# Patient Record
Sex: MALE | Race: WHITE | NOT HISPANIC OR LATINO
[De-identification: names, ages, dates, MRNs, and addresses within clinical notes are randomized per-mention and may not be internally consistent; named-entity substitution may affect disease eponyms.]

---

## 2019-11-08 ENCOUNTER — RESULT REVIEW (OUTPATIENT)
Age: 74
End: 2019-11-08

## 2019-11-08 PROBLEM — Z00.00 ENCOUNTER FOR PREVENTIVE HEALTH EXAMINATION: Status: ACTIVE | Noted: 2019-11-08

## 2019-11-11 ENCOUNTER — RECORD ABSTRACTING (OUTPATIENT)
Age: 74
End: 2019-11-11

## 2019-11-11 DIAGNOSIS — I10 ESSENTIAL (PRIMARY) HYPERTENSION: ICD-10-CM

## 2019-11-11 DIAGNOSIS — N35.919 UNSPECIFIED URETHRAL STRICTURE, MALE, UNSPECIFIED SITE: ICD-10-CM

## 2019-11-11 DIAGNOSIS — Z78.9 OTHER SPECIFIED HEALTH STATUS: ICD-10-CM

## 2019-11-11 DIAGNOSIS — Z87.891 PERSONAL HISTORY OF NICOTINE DEPENDENCE: ICD-10-CM

## 2019-11-11 DIAGNOSIS — Z87.09 PERSONAL HISTORY OF OTHER DISEASES OF THE RESPIRATORY SYSTEM: ICD-10-CM

## 2019-11-11 DIAGNOSIS — Z80.42 FAMILY HISTORY OF MALIGNANT NEOPLASM OF PROSTATE: ICD-10-CM

## 2019-11-11 DIAGNOSIS — Z86.39 PERSONAL HISTORY OF OTHER ENDOCRINE, NUTRITIONAL AND METABOLIC DISEASE: ICD-10-CM

## 2019-11-11 DIAGNOSIS — Z87.440 PERSONAL HISTORY OF URINARY (TRACT) INFECTIONS: ICD-10-CM

## 2019-11-11 DIAGNOSIS — Z87.710 PERSONAL HISTORY OF (CORRECTED) HYPOSPADIAS: ICD-10-CM

## 2019-11-11 LAB
PSA FREE FLD-MCNC: 0.6
PSA FREE FLD-MCNC: 0.6
PSA FREE FLD-MCNC: 0.63
PSA FREE FLD-MCNC: 16.5
PSA FREE FLD-MCNC: 18.1
PSA FREE FLD-MCNC: 18.2
PSA SERPL-MCNC: 3.32
PSA SERPL-MCNC: 3.47
PSA SERPL-MCNC: 3.63

## 2019-11-11 RX ORDER — SILODOSIN 8 MG/1
8 CAPSULE ORAL
Refills: 0 | Status: ACTIVE | COMMUNITY

## 2019-11-11 RX ORDER — TAMSULOSIN HCL 0.4 MG
CAPSULE ORAL
Refills: 0 | Status: ACTIVE | COMMUNITY

## 2019-11-11 RX ORDER — MONTELUKAST SODIUM 10 MG/1
TABLET, FILM COATED ORAL
Refills: 0 | Status: ACTIVE | COMMUNITY

## 2019-11-11 RX ORDER — FINASTERIDE 1 MG/1
TABLET ORAL
Refills: 0 | Status: ACTIVE | COMMUNITY

## 2019-11-13 ENCOUNTER — APPOINTMENT (OUTPATIENT)
Dept: UROLOGY | Facility: CLINIC | Age: 74
End: 2019-11-13

## 2019-12-30 ENCOUNTER — RX RENEWAL (OUTPATIENT)
Age: 74
End: 2019-12-30

## 2020-04-24 ENCOUNTER — APPOINTMENT (OUTPATIENT)
Dept: UROLOGY | Facility: CLINIC | Age: 75
End: 2020-04-24
Payer: COMMERCIAL

## 2020-04-24 DIAGNOSIS — R31.29 OTHER MICROSCOPIC HEMATURIA: ICD-10-CM

## 2020-04-24 DIAGNOSIS — N40.2 NODULAR PROSTATE W/OUT LOWER URINARY TRACT SYMPTOMS: ICD-10-CM

## 2020-04-24 DIAGNOSIS — N40.0 BENIGN PROSTATIC HYPERPLASIA WITHOUT LOWER URINARY TRACT SYMPMS: ICD-10-CM

## 2020-04-24 DIAGNOSIS — N52.9 MALE ERECTILE DYSFUNCTION, UNSPECIFIED: ICD-10-CM

## 2020-04-24 PROCEDURE — 99215 OFFICE O/P EST HI 40 MIN: CPT | Mod: 95

## 2020-04-24 RX ORDER — SILDENAFIL 100 MG/1
100 TABLET, FILM COATED ORAL
Qty: 90 | Refills: 1 | Status: ACTIVE | COMMUNITY
Start: 2020-04-24 | End: 1900-01-01

## 2020-04-24 RX ORDER — TAMSULOSIN HYDROCHLORIDE 0.4 MG/1
0.4 CAPSULE ORAL
Qty: 90 | Refills: 3 | Status: ACTIVE | COMMUNITY
Start: 2020-04-24 | End: 1900-01-01

## 2020-04-24 NOTE — ASSESSMENT
[FreeTextEntry1] : We discussed in detail his prior DREs and the need for continued FU. I recommended that he come to the office in 3 months for STEPHANIE and he will make that appointment. We also discussed remaining off the oxybutynin and remaining on the tamsulosin and finasteride, and I reordered these for him. We also discussed continuing the sildenafil 100 mg for his ED and this was also reordered. I recommended that he have a repeat PSA drawn in 6 weeks ( due to CO-V ID [id number] )  and to hannah me to review these results. We ended the video portion of the visit a 11:06 AM. Josi Parr MD\par

## 2020-04-24 NOTE — LETTER BODY
[Dear  ___] : Dear  [unfilled], [Please see my note below.] : Please see my note below. [FreeTextEntry1] : I had the pleasure of evaluation of your patient today via a telehealth virtual visit.\par  [Consult Closing:] : Thank you very much for allowing me to participate in the care of this patient.  If you have any questions, please do not hesitate to contact me. [FreeTextEntry3] : Best Regards, \par \par Josi Parr MD\par

## 2020-04-24 NOTE — HISTORY OF PRESENT ILLNESS
[Home] : at home, [unfilled] , at the time of the visit. [Other Location: e.g. Home (Enter Location, City,State)___] : at [unfilled] [Patient] : the patient [FreeTextEntry1] :  The patient-doctor relationship has been established in a face to face fashion via real time video/audio HIPAA compliant communication using telemedicine software. The patient's identity has been confirmed. The patient was previously emailed a copy of the telemedicine consent. They have had a chance to review and has now given verbal consent and has requested care to be assessed and treated through telemedicine and understands there maybe limitations in this process and they may need further follow up care in the office and or hospital settings. \par Verbal consent given on 4/24/2020, at 10:40, by Rodri Garibay.\par \par This 74 year old male is being evaluated today for the above problems. He had a prostate biopsy on 10/29/19 for a suspicious STEPHANIE and PSA of 3.63 ( 16.5%)and this returned BPH. He is also taking tamsulosin for the BPH and reports no LUTS or hematuria. \par \par Patient tells me that he had spinal surgery on 2/21/2020 and developed acute post-op retention requiring a Hartley catheter. He was seen by an Urologist at [facility] and his medication was changed. The Oxybutynin was discontinued, and finasteride 5 mg daily was added to the tamsulosin 0.4 mg daily. He has been voiding well on this regimen.

## 2020-05-19 ENCOUNTER — APPOINTMENT (OUTPATIENT)
Dept: UROLOGY | Facility: CLINIC | Age: 75
End: 2020-05-19

## 2020-09-22 ENCOUNTER — APPOINTMENT (OUTPATIENT)
Dept: UROLOGY | Facility: CLINIC | Age: 75
End: 2020-09-22

## 2020-10-27 ENCOUNTER — APPOINTMENT (OUTPATIENT)
Dept: UROLOGY | Facility: CLINIC | Age: 75
End: 2020-10-27
Payer: COMMERCIAL

## 2020-10-27 VITALS — SYSTOLIC BLOOD PRESSURE: 111 MMHG | HEART RATE: 96 BPM | TEMPERATURE: 98.3 F | DIASTOLIC BLOOD PRESSURE: 67 MMHG

## 2020-10-27 PROCEDURE — 99072 ADDL SUPL MATRL&STAF TM PHE: CPT

## 2020-10-27 PROCEDURE — 51798 US URINE CAPACITY MEASURE: CPT

## 2020-10-27 PROCEDURE — 99214 OFFICE O/P EST MOD 30 MIN: CPT | Mod: 25

## 2020-10-27 RX ORDER — TAMSULOSIN HYDROCHLORIDE 0.4 MG/1
0.4 CAPSULE ORAL
Qty: 90 | Refills: 3 | Status: ACTIVE | COMMUNITY
Start: 2019-12-30 | End: 1900-01-01

## 2020-10-27 RX ORDER — SILDENAFIL 20 MG/1
20 TABLET ORAL
Qty: 90 | Refills: 3 | Status: ACTIVE | COMMUNITY
Start: 2020-10-27 | End: 1900-01-01

## 2020-10-27 RX ORDER — FINASTERIDE 5 MG/1
5 TABLET, FILM COATED ORAL DAILY
Qty: 90 | Refills: 3 | Status: ACTIVE | COMMUNITY
Start: 2020-04-24 | End: 1900-01-01

## 2020-10-28 NOTE — HISTORY OF PRESENT ILLNESS
[Urinary Urgency] : urinary urgency [Urinary Frequency] : urinary frequency [Nocturia] : nocturia [Erectile Dysfunction] : Erectile Dysfunction [FreeTextEntry1] : 74M with history of: BPH, ED, urethral stricture, neurogenic bladder. microhematuria, prostate nodule, hypogonadism, hypospadias w. repair, asthma, HTN, hip replacement and rotator cuff repair. COVID + Sept 2020. \par \par Pt of Dr. Parr \par \par Spinal surgery Feb 2020, resulting in post-op urinary retention. Stopped oxybutynin and started on finasteride 5 mg by [facility] urologist. Currently on Tamsulosin \par \par PBNX Oct 2019 for suspicious STEPHANIE and PSA 3.63 (16.5%). Dx: BPH \par \par Denies current fever\par Nocturia 3-4 x's\par + urgency and frequency. Comfortable with current symptoms \par \par Coffee 5-6's/day \par \par Occupation: CPA\par No alcohol intake, cigarette use as a teenager. Stopped 50 yrs ago. \par \par Family history: Father prostate cancer. Denies history of breast ca, kidney cancer and bladder cancer.  [Urinary Incontinence] : no urinary incontinence [Urinary Retention] : no urinary retention [Dysuria] : no dysuria [Hematuria - Gross] : no gross hematuria [Bladder Spasm] : no bladder spasm [Abdominal Pain] : no abdominal pain [Flank Pain] : no flank pain [Edema] : ~T edema was not present [Weight Loss] : no recent ~M [unfilled] weight loss [Fever] : no fever [Fatigue] : no fatigue [Nausea] : no nausea

## 2020-10-28 NOTE — ASSESSMENT
[FreeTextEntry1] : BPH\par -PVR= 87 mL \par -UA/UCx/PSA today \par -Continue Tamsulosin 0.4 mg and finasteride 5 mg \par -would not resume oxybutynin given retention hisotry\par -Decrease caffeine intake \par \par ED\par -Continue Sildenafil 100 mg \par \par F/u 1 year or PRN

## 2020-10-28 NOTE — PHYSICAL EXAM
[General Appearance - Well Developed] : well developed [General Appearance - Well Nourished] : well nourished [Normal Appearance] : normal appearance [Well Groomed] : well groomed [General Appearance - In No Acute Distress] : no acute distress [Edema] : no peripheral edema [Respiration, Rhythm And Depth] : normal respiratory rhythm and effort [Exaggerated Use Of Accessory Muscles For Inspiration] : no accessory muscle use [Abdomen Soft] : soft [Abdomen Tenderness] : non-tender [Costovertebral Angle Tenderness] : no ~M costovertebral angle tenderness [Urethral Meatus] : meatus normal [Penis Abnormality] : normal circumcised penis [Urinary Bladder Findings] : the bladder was normal on palpation [Scrotum] : the scrotum was normal [Testes Mass (___cm)] : there were no testicular masses [Normal Station and Gait] : the gait and station were normal for the patient's age [] : no rash [No Focal Deficits] : no focal deficits [Oriented To Time, Place, And Person] : oriented to person, place, and time [Affect] : the affect was normal [Mood] : the mood was normal [Not Anxious] : not anxious [No Palpable Adenopathy] : no palpable adenopathy [FreeTextEntry1] : declines prostate exam at this time

## 2020-10-29 LAB
APPEARANCE: CLEAR
BACTERIA UR CULT: NORMAL
BACTERIA: NEGATIVE
BILIRUBIN URINE: NEGATIVE
BLOOD URINE: NEGATIVE
COLOR: NORMAL
GLUCOSE QUALITATIVE U: NEGATIVE
HYALINE CASTS: 0 /LPF
KETONES URINE: NEGATIVE
LEUKOCYTE ESTERASE URINE: NEGATIVE
MICROSCOPIC-UA: NORMAL
NITRITE URINE: NEGATIVE
PH URINE: 6.5
PROTEIN URINE: NEGATIVE
PSA FREE FLD-MCNC: 21 %
PSA FREE SERPL-MCNC: 0.44 NG/ML
PSA SERPL-MCNC: 2.12 NG/ML
RED BLOOD CELLS URINE: 2 /HPF
SPECIFIC GRAVITY URINE: 1.01
SQUAMOUS EPITHELIAL CELLS: 0 /HPF
UROBILINOGEN URINE: NORMAL
WHITE BLOOD CELLS URINE: 1 /HPF

## 2020-11-03 ENCOUNTER — NON-APPOINTMENT (OUTPATIENT)
Age: 75
End: 2020-11-03

## 2021-10-26 ENCOUNTER — APPOINTMENT (OUTPATIENT)
Dept: UROLOGY | Facility: CLINIC | Age: 76
End: 2021-10-26